# Patient Record
(demographics unavailable — no encounter records)

---

## 2024-10-14 NOTE — HISTORY OF PRESENT ILLNESS
[TextBox_4] : 25 year old woman with acne on Aldactone, Obesity, likely HARDY with snoring and daytime sleepiness, presenting for preop clearance and HARDY eval. Lungs clear on exam.

## 2024-10-14 NOTE — ASSESSMENT
[FreeTextEntry1] : Spirometry is normal today  CXR recently done also normal  Could have HARDY  Home sleep study ordered  If significant HARDY then needs to use CPAP post op  Otherwise no contraindications for surgery from pulmonary perspective  8 week follow up for HARDY jag

## 2024-12-09 NOTE — HISTORY OF PRESENT ILLNESS
[TextBox_4] : 25 year old woman with acne on Aldactone, Obesity, likely HARDY with snoring and daytime sleepiness, presenting for preop clearance and HARDY eval. Lungs clear on exam.   Home sleep study with no sleep apnea; CXR clear; lungs clear on exam; spirometry normal.

## 2024-12-09 NOTE — ASSESSMENT
[FreeTextEntry1] : Spirometry is normal  CXR recently done also normal  No evidence of HARDY on home study  No contraindications for surgery from pulmonary perspective  PRN follow up

## 2025-03-24 NOTE — ASSESSMENT
[FreeTextEntry1] : Wax found and cleaned. Cleaning well tolerated by patient. Patient felt improvement in cloginess after cleaning.  I reviewed, interpreted, and discussed the Audiogram done today. Right CHL and OME.  Scope shows no nasal/pharyngeal masses.  discussed myringotomy vs steroids.  RTC in 2 weeks for possible myringotomy.

## 2025-03-24 NOTE — PROCEDURE
[Anterior rhinoscopy insufficient to account for symptoms] : anterior rhinoscopy insufficient to account for symptoms [Flexible Endoscope] : examined with the flexible endoscope [Congested] : congested [Normal] : the nasopharynx was normal

## 2025-03-24 NOTE — REASON FOR VISIT
[Initial Evaluation] : an initial evaluation for [FreeTextEntry2] : clogged ears, bilateral impacted cerumen, otalgia

## 2025-03-24 NOTE — HISTORY OF PRESENT ILLNESS
[de-identified] : 36 year old female patient is her for initial consolation c/o clogged ears, bilateral impacted cerumen, otalgia.  Patient c/o bilateral ear pain. She has had this for about a month.  She denies any hearing loss. Had ears irrigated 3 weeks ago in [facility]. Still has otalgia in both ears. Denies any otorrhea or trouble hearing. Has history of TMJ. Ear pain is more when she's at work. Had gastric bypass surgery Jan 28, 2025.

## 2025-03-24 NOTE — PHYSICAL EXAM
[Normal] : mucosa is normal [de-identified] : B/L ear cerumen impaction removed with micro suction.

## 2025-04-14 NOTE — ASSESSMENT
[FreeTextEntry1] : persistent left ear fluid. Discussed a myringotomy. Patient will continue flonase and RTC for audio in 2 weeks.

## 2025-04-14 NOTE — HISTORY OF PRESENT ILLNESS
[FreeTextEntry1] : Patient returns following up c/o Bilateral impacted cerumen, Otalgia of both ears, TMJ dysfunction, Right otitis media with effusion. Reports little improvement. Still has pain in both ears.  Denies hearing concerns. Denies ringing.  No further updates, concerns, or complaints.

## 2025-04-14 NOTE — REASON FOR VISIT
[Subsequent Evaluation] : a subsequent evaluation for [FreeTextEntry2] : Bilateral impacted cerumen, Otalgia of both ears, TMJ dysfunction, Right otitis media with effusion

## 2025-04-28 NOTE — HISTORY OF PRESENT ILLNESS
[FreeTextEntry1] : patient returns today for a follow up on subsequent evaluation for Bilateral impacted cerumen, Otalgia of both ears, TMJ dysfunction, Right otitis media with effusion. patient states she is feeling a little better since last visit. PT states she is not having any pain she is here for a follow up

## 2025-04-28 NOTE — ASSESSMENT
[FreeTextEntry1] : I reviewed, interpreted, and discussed the Audiogram done today. right CHL. no evidence of fluid.

## 2025-04-28 NOTE — REASON FOR VISIT
[Subsequent Evaluation] : a subsequent evaluation for [FreeTextEntry2] :  Bilateral impacted cerumen, Otalgia of both ears, TMJ dysfunction, Right otitis media with effusion.